# Patient Record
Sex: MALE | Race: WHITE | NOT HISPANIC OR LATINO | Employment: FULL TIME | RURAL
[De-identification: names, ages, dates, MRNs, and addresses within clinical notes are randomized per-mention and may not be internally consistent; named-entity substitution may affect disease eponyms.]

---

## 2019-12-08 ENCOUNTER — OFFICE VISIT (OUTPATIENT)
Dept: RETAIL CLINIC | Facility: CLINIC | Age: 64
End: 2019-12-08

## 2019-12-08 VITALS
RESPIRATION RATE: 20 BRPM | DIASTOLIC BLOOD PRESSURE: 90 MMHG | OXYGEN SATURATION: 96 % | TEMPERATURE: 98.6 F | SYSTOLIC BLOOD PRESSURE: 130 MMHG | HEART RATE: 80 BPM | WEIGHT: 237.6 LBS

## 2019-12-08 DIAGNOSIS — J01.00 ACUTE MAXILLARY SINUSITIS, RECURRENCE NOT SPECIFIED: ICD-10-CM

## 2019-12-08 DIAGNOSIS — J40 BRONCHITIS: Primary | ICD-10-CM

## 2019-12-08 PROCEDURE — 99203 OFFICE O/P NEW LOW 30 MIN: CPT | Performed by: NURSE PRACTITIONER

## 2019-12-08 RX ORDER — PREDNISONE 10 MG/1
TABLET ORAL
Qty: 21 TABLET | Refills: 0 | Status: SHIPPED | OUTPATIENT
Start: 2019-12-08

## 2019-12-08 RX ORDER — CLOPIDOGREL BISULFATE 75 MG/1
TABLET ORAL
Refills: 3 | COMMUNITY
Start: 2019-11-26

## 2019-12-08 RX ORDER — BENZONATATE 100 MG/1
200 CAPSULE ORAL 3 TIMES DAILY PRN
Qty: 40 CAPSULE | Refills: 0 | Status: SHIPPED | OUTPATIENT
Start: 2019-12-08

## 2019-12-08 RX ORDER — ALBUTEROL SULFATE 90 UG/1
2 AEROSOL, METERED RESPIRATORY (INHALATION) EVERY 4 HOURS PRN
Qty: 1 INHALER | Refills: 0 | Status: SHIPPED | OUTPATIENT
Start: 2019-12-08 | End: 2020-01-07

## 2019-12-08 RX ORDER — AMLODIPINE BESYLATE 5 MG/1
TABLET ORAL
Refills: 3 | COMMUNITY
Start: 2019-11-26 | End: 2022-07-21

## 2019-12-08 RX ORDER — DOXYCYCLINE 100 MG/1
100 CAPSULE ORAL 2 TIMES DAILY
Qty: 20 CAPSULE | Refills: 0 | Status: SHIPPED | OUTPATIENT
Start: 2019-12-08 | End: 2019-12-18

## 2019-12-08 RX ORDER — LISINOPRIL 40 MG/1
TABLET ORAL
Refills: 3 | COMMUNITY
Start: 2019-11-26

## 2019-12-08 NOTE — PATIENT INSTRUCTIONS
How to Use a Metered Dose Inhaler  A metered dose inhaler is a handheld device for taking medicine that must be breathed into the lungs (inhaled). The device can be used to deliver a variety of inhaled medicines, including:  · Quick relief or rescue medicines, such as bronchodilators.  · Controller medicines, such as corticosteroids.  The medicine is delivered by pushing down on a metal canister to release a preset amount of spray and medicine. Each device contains the amount of medicine that is needed for a preset number of uses (inhalations).  Your health care provider may recommend that you use a spacer with your inhaler to help you take the medicine more effectively. A spacer is a plastic tube with a mouthpiece on one end and an opening that connects to the inhaler on the other end. A spacer holds the medicine in a tube for a short time, which allows you to inhale more medicine.  What are the risks?  If you do not use your inhaler correctly, medicine might not reach your lungs to help you breathe.  Inhaler medicine can cause side effects, such as:  · Mouth or throat infection.  · Cough.  · Hoarseness.  · Headache.  · Nausea and vomiting.  · Lung infection (pneumonia) in people who have a lung condition called COPD.  How to use a metered dose inhaler without a spacer    1. Remove the cap from the inhaler.  2. If you are using the inhaler for the first time, shake it for 5 seconds, turn it away from your face, then release 4 puffs into the air. This is called priming.  3. Shake the inhaler for 5 seconds.  4. Position the inhaler so the top of the canister faces up.  5. Put your index finger on the top of the medicine canister. Support the bottom of the inhaler with your thumb.  6. Breathe out normally and as completely as possible, away from the inhaler.  7. Either place the inhaler between your teeth and close your lips tightly around the mouthpiece, or hold the inhaler 1-2 inches (2.5-5 cm) away from your open  mouth. Keep your tongue down out of the way. If you are unsure which technique to use, ask your health care provider.  8. Press the canister down with your index finger to release the medicine, then inhale deeply and slowly through your mouth (not your nose) until your lungs are completely filled. Inhaling should take 4-6 seconds.  9. Hold the medicine in your lungs for 5-10 seconds (10 seconds is best). This helps the medicine get into the small airways of your lungs.  10. With your lips in a tight Council (pursed), breathe out slowly.  11. Repeat steps 3-10 until you have taken the number of puffs that your health care provider directed. Wait about 1 minute between puffs or as directed.  12. Put the cap on the inhaler.  13. If you are using a steroid inhaler, rinse your mouth with water, gargle, and spit out the water. Do not swallow the water.  How to use a metered dose inhaler with a spacer    1. Remove the cap from the inhaler.  2. If you are using the inhaler for the first time, shake it for 5 seconds, turn it away from your face, then release 4 puffs into the air. This is called priming.  3. Shake the inhaler for 5 seconds.  4. Place the open end of the spacer onto the inhaler mouthpiece.  5. Position the inhaler so the top of the canister faces up and the spacer mouthpiece faces you.  6. Put your index finger on the top of the medicine canister. Support the bottom of the inhaler and the spacer with your thumb.  7. Breathe out normally and as completely as possible, away from the spacer.  8. Place the spacer between your teeth and close your lips tightly around it. Keep your tongue down out of the way.  9. Press the canister down with your index finger to release the medicine, then inhale deeply and slowly through your mouth (not your nose) until your lungs are completely filled. Inhaling should take 4-6 seconds.  10. Hold the medicine in your lungs for 5-10 seconds (10 seconds is best). This helps the  medicine get into the small airways of your lungs.  11. With your lips in a tight [place] (pursed), breathe out slowly.  12. Repeat steps 3-11 until you have taken the number of puffs that your health care provider directed. Wait about 1 minute between puffs or as directed.  13. Remove the spacer from the inhaler and put the cap on the inhaler.  14. If you are using a steroid inhaler, rinse your mouth with water, gargle, and spit out the water. Do not swallow the water.  Follow these instructions at home:  · Take your inhaled medicine only as told by your health care provider. Do not use the inhaler more than directed by your health care provider.  · Keep all follow-up visits as told by your health care provider. This is important.  · If your inhaler has a counter, you can check it to determine how full your inhaler is. If your inhaler does not have a counter, ask your health care provider when you will need to refill your inhaler and write the refill date on a calendar or on your inhaler canister. Note that you cannot know when an inhaler is empty by shaking it.  · Follow directions on the package insert for care and cleaning of your inhaler and spacer.  Contact a health care provider if:  · Symptoms are only partially relieved with your inhaler.  · You are having trouble using your inhaler.  · You have an increase in phlegm.  · You have headaches.  Get help right away if:  · You feel little or no relief after using your inhaler.  · You have dizziness.  · You have a fast heart rate.  · You have chills or a fever.  · You have night sweats.  · There is blood in your phlegm.  Summary  · A metered dose inhaler is a handheld device for taking medicine that must be breathed into the lungs (inhaled).  · The medicine is delivered by pushing down on a metal canister to release a preset amount of spray and medicine.  · Each device contains the amount of medicine that is needed for a preset number of uses (inhalations).  This  information is not intended to replace advice given to you by your health care provider. Make sure you discuss any questions you have with your health care provider.  Document Released: 12/18/2006 Document Revised: 07/09/2018 Document Reviewed: 11/07/2017  [employer] Interactive Patient Education © 2019 [employer]    Sinusitis, Adult  Sinusitis is soreness and swelling (inflammation) of your sinuses. Sinuses are hollow spaces in the bones around your face. They are located:  · Around your eyes.  · In the middle of your forehead.  · Behind your nose.  · In your cheekbones.  Your sinuses and nasal passages are lined with a fluid called mucus. Mucus drains out of your sinuses. Swelling can trap mucus in your sinuses. This lets germs (bacteria, virus, or fungus) grow, which leads to infection. Most of the time, this condition is caused by a virus.  What are the causes?  This condition is caused by:  · Allergies.  · Asthma.  · Germs.  · Things that block your nose or sinuses.  · Growths in the nose (nasal polyps).  · Chemicals or irritants in the air.  · Fungus (rare).  What increases the risk?  You are more likely to develop this condition if:  · You have a weak body defense system (immune system).  · You do a lot of swimming or diving.  · You use nasal sprays too much.  · You smoke.  What are the signs or symptoms?  The main symptoms of this condition are pain and a feeling of pressure around the sinuses. Other symptoms include:  · Stuffy nose (congestion).  · Runny nose (drainage).  · Swelling and warmth in the sinuses.  · Headache.  · Toothache.  · A cough that may get worse at night.  · Mucus that collects in the throat or the back of the nose (postnasal drip).  · Being unable to smell and taste.  · Being very tired (fatigue).  · A fever.  · Sore throat.  · Bad breath.  How is this diagnosed?  This condition is diagnosed based on:  · Your symptoms.  · Your medical history.  · A physical exam.  · Tests to find out  if your condition is short-term (acute) or long-term (chronic). Your doctor may:  ? Check your nose for growths (polyps).  ? Check your sinuses using a tool that has a light (endoscope).  ? Check for allergies or germs.  ? Do imaging tests, such as an MRI or CT scan.  How is this treated?  Treatment for this condition depends on the cause and whether it is short-term or long-term.  · If caused by a virus, your symptoms should go away on their own within 10 days. You may be given medicines to relieve symptoms. They include:  ? Medicines that shrink swollen tissue in the nose.  ? Medicines that treat allergies (antihistamines).  ? A spray that treats swelling of the nostrils.   ? Rinses that help get rid of thick mucus in your nose (nasal saline washes).  · If caused by bacteria, your doctor may wait to see if you will get better without treatment. You may be given antibiotic medicine if you have:  ? A very bad infection.  ? A weak body defense system.  · If caused by growths in the nose, you may need to have surgery.  Follow these instructions at home:  Medicines  · Take, use, or apply over-the-counter and prescription medicines only as told by your doctor. These may include nasal sprays.  · If you were prescribed an antibiotic medicine, take it as told by your doctor. Do not stop taking the antibiotic even if you start to feel better.  Hydrate and humidify    · Drink enough water to keep your pee (urine) pale yellow.  · Use a cool mist humidifier to keep the humidity level in your home above 50%.  · Breathe in steam for 10-15 minutes, 3-4 times a day, or as told by your doctor. You can do this in the bathroom while a hot shower is running.  · Try not to spend time in cool or dry air.  Rest  · Rest as much as you can.  · Sleep with your head raised (elevated).  · Make sure you get enough sleep each night.  General instructions    · Put a warm, moist washcloth on your face 3-4 times a day, or as often as told by your  doctor. This will help with discomfort.  · Wash your hands often with soap and water. If there is no soap and water, use hand .  · Do not smoke. Avoid being around people who are smoking (secondhand smoke).  · Keep all follow-up visits as told by your doctor. This is important.  Contact a doctor if:  · You have a fever.  · Your symptoms get worse.  · Your symptoms do not get better within 10 days.  Get help right away if:  · You have a very bad headache.  · You cannot stop throwing up (vomiting).  · You have very bad pain or swelling around your face or eyes.  · You have trouble seeing.  · You feel confused.  · Your neck is stiff.  · You have trouble breathing.  Summary  · Sinusitis is swelling of your sinuses. Sinuses are hollow spaces in the bones around your face.  · This condition is caused by tissues in your nose that become inflamed or swollen. This traps germs. These can lead to infection.  · If you were prescribed an antibiotic medicine, take it as told by your doctor. Do not stop taking it even if you start to feel better.  · Keep all follow-up visits as told by your doctor. This is important.  This information is not intended to replace advice given to you by your health care provider. Make sure you discuss any questions you have with your health care provider.  Document Released: 06/05/2009 Document Revised: 05/20/2019 Document Reviewed: 05/20/2019  [employer] Interactive Patient Education © 2019 [employer]    Acute Bronchitis, Adult  Acute bronchitis is when air tubes (bronchi) in the lungs suddenly get swollen. The condition can make it hard to breathe. It can also cause these symptoms:  · A cough.  · Coughing up clear, yellow, or green mucus.  · Wheezing.  · Chest congestion.  · Shortness of breath.  · A fever.  · Body aches.  · Chills.  · A sore throat.  Follow these instructions at home:    Medicines  · Take over-the-counter and prescription medicines only as told by your doctor.  · If you  were prescribed an antibiotic medicine, take it as told by your doctor. Do not stop taking the antibiotic even if you start to feel better.  General instructions  · Rest.  · Drink enough fluids to keep your pee (urine) pale yellow.  · Avoid smoking and secondhand smoke. If you smoke and you need help quitting, ask your doctor. Quitting will help your lungs heal faster.  · Use an inhaler, cool mist vaporizer, or humidifier as told by your doctor.  · Keep all follow-up visits as told by your doctor. This is important.  How is this prevented?  To lower your risk of getting this condition again:  · Wash your hands often with soap and water. If you cannot use soap and water, use hand .  · Avoid contact with people who have cold symptoms.  · Try not to touch your hands to your mouth, nose, or eyes.  · Make sure to get the flu shot every year.  Contact a doctor if:  · Your symptoms do not get better in 2 weeks.  Get help right away if:  · You cough up blood.  · You have chest pain.  · You have very bad shortness of breath.  · You become dehydrated.  · You faint (pass out) or keep feeling like you are going to pass out.  · You keep throwing up (vomiting).  · You have a very bad headache.  · Your fever or chills gets worse.  This information is not intended to replace advice given to you by your health care provider. Make sure you discuss any questions you have with your health care provider.  Document Released: 06/05/2009 Document Revised: 08/01/2018 Document Reviewed: 06/07/2017  [employer] Interactive Patient Education © 2019 [employer]

## 2019-12-08 NOTE — PROGRESS NOTES
Subjective   Jorge Hartley is a 64 y.o. male.   Chief Complaint   Patient presents with   • Cough      Cough   This is a new problem. The current episode started 1 to 4 weeks ago (3+ weeks). The problem has been gradually worsening. The problem occurs every few minutes. The cough is productive of sputum. Associated symptoms include headaches, nasal congestion, postnasal drip, rhinorrhea, shortness of breath and wheezing. The symptoms are aggravated by lying down. He has tried OTC cough suppressant for the symptoms. The treatment provided no relief.        The following portions of the patient's history were reviewed and updated as appropriate: allergies, current medications, past family history, past medical history, past social history, past surgical history and problem list.    Review of Systems   Constitutional: Positive for fatigue.   HENT: Positive for congestion, postnasal drip, rhinorrhea, sinus pressure and sinus pain.    Eyes: Negative.    Respiratory: Positive for cough, chest tightness, shortness of breath and wheezing.    Gastrointestinal: Negative.    Genitourinary: Negative.    Skin: Negative.    Allergic/Immunologic: Negative.    Neurological: Positive for headaches.   Psychiatric/Behavioral: Negative.    All other systems reviewed and are negative.      Objective   Allergies   Allergen Reactions   • Aspirin Rash       Physical Exam   Constitutional: He is oriented to person, place, and time. He appears well-developed and well-nourished.   HENT:   Right Ear: Tympanic membrane normal.   Left Ear: Tympanic membrane normal.   Nose: Mucosal edema, rhinorrhea and sinus tenderness present. Right sinus exhibits maxillary sinus tenderness. Left sinus exhibits maxillary sinus tenderness.   Mouth/Throat: Oropharynx is clear and moist.   Mucoid PND   Eyes: Pupils are equal, round, and reactive to light.   Neck: Neck supple.   Cardiovascular: Normal rate and regular rhythm.   Pulmonary/Chest: Effort normal. He  has wheezes in the right upper field, the right middle field, the right lower field, the left upper field and the left lower field.   Coarse breath sounds   Abdominal: Soft. Bowel sounds are normal. There is no tenderness. There is no rigidity, no rebound and no guarding.   Musculoskeletal: Normal range of motion.   Lymphadenopathy:     He has no cervical adenopathy.   Neurological: He is alert and oriented to person, place, and time.   Skin: Skin is warm and dry. No rash noted.   Psychiatric: He has a normal mood and affect.   Vitals reviewed.      Assessment/Plan   Jorge was seen today for cough.    Diagnoses and all orders for this visit:    Bronchitis    Acute maxillary sinusitis, recurrence not specified    Other orders  -     doxycycline (MONODOX) 100 MG capsule; Take 1 capsule by mouth 2 (Two) Times a Day for 10 days.  -     benzonatate (TESSALON) 100 MG capsule; Take 2 capsules by mouth 3 (Three) Times a Day As Needed for Cough.  -     albuterol sulfate  (90 Base) MCG/ACT inhaler; Inhale 2 puffs Every 4 (Four) Hours As Needed for Wheezing or Shortness of Air (cough) for up to 30 days.  -     predniSONE (DELTASONE) 10 MG tablet; Take as directed per 6 day pred fany                 This document has been electronically signed by GRISELDA Aguilar December 8, 2019 3:02 PM

## 2020-12-18 ENCOUNTER — IMMUNIZATION (OUTPATIENT)
Dept: VACCINE CLINIC | Facility: HOSPITAL | Age: 65
End: 2020-12-18

## 2020-12-18 PROCEDURE — 0001A: CPT | Performed by: FAMILY MEDICINE

## 2020-12-18 PROCEDURE — 91300 HC SARSCOV02 VAC 30MCG/0.3ML IM: CPT | Performed by: FAMILY MEDICINE

## 2021-01-08 ENCOUNTER — IMMUNIZATION (OUTPATIENT)
Dept: VACCINE CLINIC | Facility: HOSPITAL | Age: 66
End: 2021-01-08

## 2021-01-08 PROCEDURE — 0001A: CPT | Performed by: FAMILY MEDICINE

## 2021-01-08 PROCEDURE — 0002A: CPT | Performed by: FAMILY MEDICINE

## 2021-01-08 PROCEDURE — 91300 HC SARSCOV02 VAC 30MCG/0.3ML IM: CPT | Performed by: FAMILY MEDICINE

## 2021-09-27 ENCOUNTER — IMMUNIZATION (OUTPATIENT)
Dept: VACCINE CLINIC | Facility: HOSPITAL | Age: 66
End: 2021-09-27

## 2021-09-27 PROCEDURE — 91300 HC SARSCOV02 VAC 30MCG/0.3ML IM: CPT | Performed by: INTERNAL MEDICINE

## 2021-09-27 PROCEDURE — 0003A: CPT | Performed by: INTERNAL MEDICINE

## 2022-06-21 ENCOUNTER — OFFICE VISIT (OUTPATIENT)
Dept: CARDIOLOGY | Facility: CLINIC | Age: 67
End: 2022-06-21

## 2022-06-21 VITALS
WEIGHT: 231 LBS | RESPIRATION RATE: 16 BRPM | DIASTOLIC BLOOD PRESSURE: 75 MMHG | HEIGHT: 72 IN | HEART RATE: 89 BPM | BODY MASS INDEX: 31.29 KG/M2 | SYSTOLIC BLOOD PRESSURE: 124 MMHG

## 2022-06-21 DIAGNOSIS — I49.1 PREMATURE ATRIAL CONTRACTIONS: ICD-10-CM

## 2022-06-21 DIAGNOSIS — R00.2 PALPITATIONS: Primary | ICD-10-CM

## 2022-06-21 DIAGNOSIS — I10 ESSENTIAL HYPERTENSION: ICD-10-CM

## 2022-06-21 PROCEDURE — 99204 OFFICE O/P NEW MOD 45 MIN: CPT | Performed by: INTERNAL MEDICINE

## 2022-06-21 PROCEDURE — 93000 ELECTROCARDIOGRAM COMPLETE: CPT | Performed by: INTERNAL MEDICINE

## 2022-06-21 RX ORDER — CHLORAL HYDRATE 500 MG
CAPSULE ORAL
COMMUNITY

## 2022-06-21 RX ORDER — CETIRIZINE HYDROCHLORIDE 10 MG/1
10 TABLET ORAL DAILY
COMMUNITY

## 2022-06-21 NOTE — PROGRESS NOTES
Hensley, Edith Collett, APRN  Jorge Hartley  1955  06/21/2022    There is no problem list on file for this patient.      Dear Hensley, Edith Collett, APRN:    Pito Hartley is a 67 y.o. male with the problems as listed above, presents    Chief complaint: Palpitations with skipped beats.    History of Present Illness: Mr. Hartley is a pleasant 67-year-old  male with no history of known heart disease or cardiac arrhythmias, presents with complaints of having noticed some skipped heartbeats when he checked his pulse recently.  There are some associated palpitations but no dizziness or syncope.  Hence he wanted to be evaluated for any significant cardiac arrhythmias.  On further questioning he denies any previous history of known cardiac arrhythmias.  He has history of hypertension but nondiabetic.  He stays fairly active and biking 5 days a week and lifting weights twice a week.  He denies any symptoms during exercise.  He denies any excessive caffeine consumption.  He denies any chest pains or shortness of breath.  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    Allergies   Allergen Reactions   • Nsaids Swelling   • Aspirin Rash   :      Current Outpatient Medications:   •  amLODIPine (NORVASC) 5 MG tablet, , Disp: , Rfl: 3  •  cetirizine (zyrTEC) 10 MG tablet, Take 10 mg by mouth Daily., Disp: , Rfl:   •  Cholecalciferol (vitamin D3) 125 MCG (5000 UT) tablet tablet, Take 5,000 Units by mouth Daily., Disp: , Rfl:   •  clopidogrel (PLAVIX) 75 MG tablet, , Disp: , Rfl: 3  •  lisinopril (PRINIVIL,ZESTRIL) 40 MG tablet, , Disp: , Rfl: 3  •  Omega-3 Fatty Acids (fish oil) 1000 MG capsule capsule, Take  by mouth Daily With Breakfast., Disp: , Rfl:   •  benzonatate (TESSALON) 100 MG capsule, Take 2 capsules by mouth 3 (Three) Times a Day As Needed for Cough., Disp: 40 capsule, Rfl: 0  •  predniSONE (DELTASONE)  "10 MG tablet, Take as directed per 6 day pred fany, Disp: 21 tablet, Rfl: 0    Past Medical History:   Diagnosis Date   • Hypertension      Past Surgical History:   Procedure Laterality Date   • NAVICULAR RUSSIE BONE GRAFT WITH PINNING     • OSTEOTOMY     • SINUS SURGERY       Family History   Problem Relation Age of Onset   • Heart attack Mother    • Heart disease Mother      Social History     Tobacco Use   • Smoking status: Never Smoker   • Smokeless tobacco: Never Used   Substance Use Topics   • Alcohol use: Never   • Drug use: Never       Review of Systems   Constitutional: Negative for chills, fever, malaise/fatigue, weight gain and weight loss.   HENT: Positive for congestion. Negative for nosebleeds.    Eyes: Positive for visual disturbance.   Cardiovascular: Negative for chest pain, irregular heartbeat, leg swelling and orthopnea.   Respiratory: Negative for cough, hemoptysis and shortness of breath.    Musculoskeletal: Positive for joint pain, joint swelling and stiffness.   Gastrointestinal: Negative for abdominal pain, change in bowel habit, hematemesis, melena and vomiting.   Genitourinary: Negative for dysuria, frequency and hematuria.   Neurological: Negative for focal weakness, headaches, light-headedness and weakness.     Objective   Blood pressure 124/75, pulse 89, resp. rate 16, height 182.9 cm (72\"), weight 105 kg (231 lb).  Body mass index is 31.33 kg/m².    Constitutional:       Appearance: Well-developed.   Eyes:      Conjunctiva/sclera: Conjunctivae normal.   HENT:      Head: Normocephalic.   Neck:      Thyroid: No thyromegaly.      Vascular: No JVD.      Trachea: No tracheal deviation.   Pulmonary:      Effort: No respiratory distress.      Breath sounds: Normal breath sounds. No wheezing. No rales.   Cardiovascular:      PMI at left midclavicular line. Normal rate. Regular rhythm. Normal S1. Normal S2.      Murmurs: There is a continuous murmur.      No gallop. No click. No rub.   Pulses:    "  Intact distal pulses.   Edema:     Peripheral edema absent.   Abdominal:      General: Bowel sounds are normal.      Palpations: Abdomen is soft. There is no abdominal mass.      Tenderness: There is no abdominal tenderness.   Musculoskeletal:      Cervical back: Normal range of motion and neck supple. Skin:     General: Skin is warm and dry.   Neurological:      Mental Status: Alert and oriented to person, place, and time.      Cranial Nerves: No cranial nerve deficit.             ECG 12 Lead    Date/Time: 6/21/2022 1:58 PM  Performed by: Mario Rocha MD  Authorized by: Mario Rocha MD   Previous ECG: no previous ECG available  Rhythm: sinus rhythm  Rhythm comments: Occasional PACs  Conduction: conduction normal  ST Segments: ST segments normal                    Assessment & Plan :   Diagnosis Plan   1. Palpitations     2. Premature atrial contractions     3. Essential hypertension       Recommendations:  Orders Placed This Encounter   Procedures   • Cardiac Event Monitor   • ECG 12 Lead      We will evaluate his palpitations with an event monitor.    Return in about 5 weeks (around 7/26/2022).    As always, Hensley, Edith Collett, APRN  I appreciate very much the opportunity to participate in the cardiovascular care of your patients. Please do not hesitate to call me with any questions with regards to Jorge Hartley's evaluation and management.       With Best Regards,        Mario Rocha MD, [facility]    Please note that portions of this note were completed with a voice recognition program.

## 2022-07-20 ENCOUNTER — TELEPHONE (OUTPATIENT)
Dept: CARDIOLOGY | Facility: CLINIC | Age: 67
End: 2022-07-20

## 2022-07-20 DIAGNOSIS — R00.2 PALPITATIONS: ICD-10-CM

## 2022-07-21 ENCOUNTER — OFFICE VISIT (OUTPATIENT)
Dept: CARDIOLOGY | Facility: CLINIC | Age: 67
End: 2022-07-21

## 2022-07-21 VITALS
SYSTOLIC BLOOD PRESSURE: 134 MMHG | HEART RATE: 72 BPM | WEIGHT: 227.6 LBS | OXYGEN SATURATION: 98 % | DIASTOLIC BLOOD PRESSURE: 83 MMHG | BODY MASS INDEX: 30.83 KG/M2 | HEIGHT: 72 IN

## 2022-07-21 DIAGNOSIS — R00.2 PALPITATIONS: ICD-10-CM

## 2022-07-21 DIAGNOSIS — I77.1 STENOSIS OF ILIAC ARTERY, UNSPECIFIED LATERALITY: ICD-10-CM

## 2022-07-21 DIAGNOSIS — I48.91 NEW ONSET ATRIAL FIBRILLATION: Primary | ICD-10-CM

## 2022-07-21 DIAGNOSIS — R00.0 TACHYCARDIA: ICD-10-CM

## 2022-07-21 DIAGNOSIS — R53.83 FATIGUE, UNSPECIFIED TYPE: ICD-10-CM

## 2022-07-21 PROCEDURE — 99214 OFFICE O/P EST MOD 30 MIN: CPT | Performed by: PHYSICIAN ASSISTANT

## 2022-07-21 RX ORDER — METOPROLOL SUCCINATE 25 MG/1
25 TABLET, EXTENDED RELEASE ORAL DAILY
Qty: 90 TABLET | Refills: 3 | Status: SHIPPED | OUTPATIENT
Start: 2022-07-21

## 2022-07-21 NOTE — PROGRESS NOTES
"Hensley, Edith Collett, GRISELDA  Jorge Hartley  1955  07/21/2022    There is no problem list on file for this patient.      Dear Hensley, Edith Collett, APRN:    Subjective     History of Present Illness:    Chief Complaint   Patient presents with   • Palpitations     F/U   • Follow-up     ABN EVENT, pt has no complaints today   • Med Management     verbally       Jorge Hartley is a pleasant 67 y.o. male with a past medical history significant for no known coronary artery disease, nondiabetic, non-smoker.  He does have previous history of right external iliac artery stenting secondary to trauma roughly 17 years ago has chronically been on Plavix ever since.  He comes in today for results of 30-day Holter monitor.    Jorge reports that he has been having an irregular heart beats now for several weeks. He states that he first felt this after riding his stationary bike for 24 minutes 5 days a week and states that he rides this at a moderate to high intense pace. He states he started noticing his heart rate staying elevated longer than average staying around 110-120 bpm for several minutes after finishing his ride stating that previously his heart rate would recover quickly just a few minutes after finishing. He also states that he has been able to feel his heart rate \"skip\" several times in a row on a near daily bases. He does drink coffee but states he only drinks one cup a day and takes in no other forms of caffeine. He denies any history of bleeding issues in the past.                     Allergies   Allergen Reactions   • Nsaids Swelling   • Aspirin Rash   :      Current Outpatient Medications:   •  cetirizine (zyrTEC) 10 MG tablet, Take 10 mg by mouth Daily., Disp: , Rfl:   •  Cholecalciferol (vitamin D3) 125 MCG (5000 UT) tablet tablet, Take 5,000 Units by mouth Daily., Disp: , Rfl:   •  clopidogrel (PLAVIX) 75 MG tablet, , Disp: , Rfl: 3  •  lisinopril (PRINIVIL,ZESTRIL) 40 MG tablet, , Disp: , Rfl: 3  •  " "Omega-3 Fatty Acids (fish oil) 1000 MG capsule capsule, Take  by mouth Daily With Breakfast., Disp: , Rfl:   •  apixaban (ELIQUIS) 5 MG tablet tablet, Take 1 tablet by mouth 2 (Two) Times a Day., Disp: 60 tablet, Rfl: 2  •  benzonatate (TESSALON) 100 MG capsule, Take 2 capsules by mouth 3 (Three) Times a Day As Needed for Cough., Disp: 40 capsule, Rfl: 0  •  metoprolol succinate XL (TOPROL-XL) 25 MG 24 hr tablet, Take 1 tablet by mouth Daily., Disp: 90 tablet, Rfl: 3  •  predniSONE (DELTASONE) 10 MG tablet, Take as directed per 6 day pred fany, Disp: 21 tablet, Rfl: 0    The following portions of the patient's history were reviewed and updated as appropriate: allergies, current medications, past family history, past medical history, past social history, past surgical history and problem list.    Social History     Tobacco Use   • Smoking status: Never Smoker   • Smokeless tobacco: Never Used   Substance Use Topics   • Alcohol use: Never   • Drug use: Never         Objective   Vitals:    07/21/22 1418   BP: 134/83   Pulse: 72   SpO2: 98%   Weight: 103 kg (227 lb 9.6 oz)   Height: 182.9 cm (72\")     Body mass index is 30.87 kg/m².    Constitutional:       General: Not in acute distress.     Appearance: Healthy appearance. Well-developed and not in distress. Not diaphoretic.   Eyes:      Conjunctiva/sclera: Conjunctivae normal.      Pupils: Pupils are equal, round, and reactive to light.   HENT:      Head: Normocephalic and atraumatic.   Neck:      Vascular: No carotid bruit or JVD.   Pulmonary:      Effort: Pulmonary effort is normal. No respiratory distress.      Breath sounds: Normal breath sounds.   Cardiovascular:      Normal rate. Regular rhythm.   Skin:     General: Skin is cool.   Neurological:      Mental Status: Alert, oriented to person, place, and time and oriented to person, place and time.         During this visit the following were done:  Labs Reviewed []    Labs Ordered []    Radiology Reports Reviewed " []    Radiology Ordered []    PCP Records Reviewed []    Referring Provider Records Reviewed []    ER Records Reviewed []    Hospital Records Reviewed []    History Obtained From Family []    Radiology Images Reviewed []    Other Reviewed []    Records Requested []       Procedures    Assessment & Plan    Diagnosis Plan   1. New onset atrial fibrillation (HCC)  Adult Transthoracic Echo Complete W/ Cont if Necessary Per Protocol    CBC (No Diff)    Magnesium    Testosterone (Free & Total), LC / MS    Ambulatory Referral to Cardiac Electrophysiology   2. Fatigue, unspecified type  Testosterone (Free & Total), LC / MS   3. Tachycardia  Adult Transthoracic Echo Complete W/ Cont if Necessary Per Protocol   4. Palpitations  Adult Transthoracic Echo Complete W/ Cont if Necessary Per Protocol   5. Stenosis of iliac artery, unspecified laterality (HCC)  Ambulatory Referral to Vascular Surgery            Recommendations:  1. New onset atrial fibrillation  1. Since Jorge does have hypertension and is greater than 65 years of age this does make his [id number] score 2, this chronic anticoagulation is recommended.  I will prescribe Eliquis 5 mg twice daily.  I would like for him to check a CBC and magnesium level in 1 week.  2. I will also order echocardiogram to rule out structural heart disease.  3. Patient denies any anginal symptoms whatsoever.  He reports that he rides at a moderate to high intensity on a stationary bike 5 days a week for at least 24 minutes with no chest pains.  Thus I will hold off on stress testing for now.  4. Jorge did bring up wether or not an ablation would be a good option for him. I did offer a referral to an electrophysiologist for better evaluation for this.  He was agreeable to seeing an electrophysiologist so I did place this referral.  5. I did review his labs from PCPs office which showed stable creatinine, potassium, and TSH.  2. 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 For the use of Plavix in addition to Eliquis, I did discuss the case thoroughly with Dr. Rocha.  Given that his stent was a result of trauma and not atherosclerosis may be acceptable to stop Plavix now that he will be on Eliquis as well, as he has no history of any atherosclerosis.  However, would like for him to see vascular surgeon for this clearance.  3. Fatigue   1. He also reports chronic fatigue and would like to have his testosterone checked so I did place this order.  However if abnormal he will have to see PCP regarding treatment.  He expressed understanding with this.      Return in about 4 weeks (around 8/18/2022).    As always, I appreciate very much the opportunity to participate in the cardiovascular care of your patients.      With Best Regards,    Az Jade PA-C

## 2022-07-22 ENCOUNTER — TELEPHONE (OUTPATIENT)
Dept: CARDIOLOGY | Facility: CLINIC | Age: 67
End: 2022-07-22

## 2022-07-26 ENCOUNTER — TREATMENT (OUTPATIENT)
Dept: CARDIOLOGY | Facility: CLINIC | Age: 67
End: 2022-07-26

## 2022-07-26 DIAGNOSIS — R55 SYNCOPE AND COLLAPSE: Primary | ICD-10-CM

## 2022-07-26 PROCEDURE — 93228 REMOTE 30 DAY ECG REV/REPORT: CPT | Performed by: INTERNAL MEDICINE

## 2022-07-28 PROBLEM — R55 SYNCOPE AND COLLAPSE: Status: ACTIVE | Noted: 2022-07-28

## 2022-10-31 ENCOUNTER — TRANSCRIBE ORDERS (OUTPATIENT)
Dept: ADMINISTRATIVE | Facility: HOSPITAL | Age: 67
End: 2022-10-31

## 2022-10-31 ENCOUNTER — LAB (OUTPATIENT)
Dept: LAB | Facility: HOSPITAL | Age: 67
End: 2022-10-31

## 2022-10-31 DIAGNOSIS — Z01.818 PREOP TESTING: ICD-10-CM

## 2022-10-31 DIAGNOSIS — Z01.818 PREOP TESTING: Primary | ICD-10-CM

## 2022-10-31 PROCEDURE — U0004 COV-19 TEST NON-CDC HGH THRU: HCPCS

## 2022-10-31 PROCEDURE — C9803 HOPD COVID-19 SPEC COLLECT: HCPCS

## 2022-10-31 PROCEDURE — U0005 INFEC AGEN DETEC AMPLI PROBE: HCPCS

## 2022-11-01 LAB — SARS-COV-2 RNA PNL SPEC NAA+PROBE: NOT DETECTED
